# Patient Record
Sex: FEMALE | Race: WHITE | NOT HISPANIC OR LATINO | Employment: FULL TIME | URBAN - METROPOLITAN AREA
[De-identification: names, ages, dates, MRNs, and addresses within clinical notes are randomized per-mention and may not be internally consistent; named-entity substitution may affect disease eponyms.]

---

## 2022-06-17 ENCOUNTER — TRANSFERRED RECORDS (OUTPATIENT)
Dept: HEALTH INFORMATION MANAGEMENT | Facility: CLINIC | Age: 42
End: 2022-06-17

## 2022-06-20 ENCOUNTER — TELEPHONE (OUTPATIENT)
Dept: OPHTHALMOLOGY | Facility: CLINIC | Age: 42
End: 2022-06-20
Payer: COMMERCIAL

## 2022-06-20 NOTE — TELEPHONE ENCOUNTER
"Spoke with patient regarding Eye Referral and scheduling with  for: eyelid cyst LE\"-Per Dr.Andrea Jana CERVANTES Scheduled patient accordingly and completed registration. Sent appointment letter and map to updated address.-Per Patient   "

## 2022-06-21 NOTE — TELEPHONE ENCOUNTER
FUTURE VISIT INFORMATION      FUTURE VISIT INFORMATION:    Date: 8/30/22    Time: 2:00pm    [place] [facility] [place]:    Referring provider:  Dr.Andrea Bates    Referring providers clinic:  [facility]    Reason for visit/diagnosis  eyelid cyst LE    RECORDS REQUESTED FROM:       Clinic name Comments Records Status Imaging Status   [facility] Request for recs sent 6/21- resent 7/13- received an sent to scanning [facility]

## 2022-08-30 ENCOUNTER — PRE VISIT (OUTPATIENT)
Dept: OPHTHALMOLOGY | Facility: CLINIC | Age: 42
End: 2022-08-30
Payer: COMMERCIAL